# Patient Record
Sex: MALE
[De-identification: names, ages, dates, MRNs, and addresses within clinical notes are randomized per-mention and may not be internally consistent; named-entity substitution may affect disease eponyms.]

---

## 2021-08-02 PROBLEM — Z00.00 ENCOUNTER FOR PREVENTIVE HEALTH EXAMINATION: Status: ACTIVE | Noted: 2021-08-02

## 2021-08-11 ENCOUNTER — APPOINTMENT (OUTPATIENT)
Dept: PULMONOLOGY | Facility: CLINIC | Age: 46
End: 2021-08-11
Payer: COMMERCIAL

## 2021-08-11 VITALS
WEIGHT: 172 LBS | OXYGEN SATURATION: 97 % | BODY MASS INDEX: 28.66 KG/M2 | DIASTOLIC BLOOD PRESSURE: 80 MMHG | SYSTOLIC BLOOD PRESSURE: 128 MMHG | HEART RATE: 113 BPM | RESPIRATION RATE: 17 BRPM | HEIGHT: 65 IN | TEMPERATURE: 97 F

## 2021-08-11 PROCEDURE — 99203 OFFICE O/P NEW LOW 30 MIN: CPT

## 2021-08-11 NOTE — PHYSICAL EXAM
[No Acute Distress] : no acute distress [Well Nourished] : well nourished [Well Developed] : well developed [Normal Oropharynx] : normal oropharynx [I] : Mallampati Class: I [Supple] : supple [Normal Appearance] : normal appearance [No Neck Mass] : no neck mass [No JVD] : no jvd [Normal Rate/Rhythm] : normal rate/rhythm [Normal S1, S2] : normal s1, s2 [No Murmurs] : no murmurs [No Resp Distress] : no resp distress [Clear to Auscultation Bilaterally] : clear to auscultation bilaterally [Benign] : benign [Not Tender] : not tender [No Masses] : no masses [No Clubbing] : no clubbing [No Edema] : no edema [No Focal Deficits] : no focal deficits [Oriented x3] : oriented x3 [Normal Affect] : normal affect

## 2021-08-12 NOTE — DISCUSSION/SUMMARY
[FreeTextEntry1] : incidental small nodule in RML\par \par PLAN\par \par NC CT chest\par \par will bring in records of treatment of latent TB\par \par Raffy Chadwick MD

## 2021-08-12 NOTE — HISTORY OF PRESENT ILLNESS
[TextBox_4] : 46 year old patient presents for evaluation of 3 mm RML nodule in subpleural location noted on CT abdomen obtained by his urologist in evaluation of dysuria\par \par \par He has no active pulmonary problems.  He denies history of COPD, asthma, pneumonia, chest pain, postnasal drip,  sinusitis, wheeze, cough, sputum production or shortness of breath. 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, computer work\par \par No exposure to chemicals, dust, asbestos, mold\par \par \par ALLERGY:\par \par NKDA\par \par \par environmental/seasonal allergy:   none\par \par \par Review of Systems:\par \par \par no sinusitis, sinus infections, nasal obstruction\par no dysphagia\par no dry mouth\par \par no arthritis\par no joint aches\par no joint swelling\par \par \par no pneumonia\par no wheeze\par no lung cancer\par \par no CAD\par no MI\par no chest pain\par no murmur\par no CHF\par no HTN\par no edema\par \par no peptic ulcer or gastritis\par \par \par no Diabetes\par no thyroid disease\par no hyperlipidemia\par \par \par no bleeding\par \par no DVT or PE\par \par no kidney disease\par \par no stroke\par no seizure\par \par \par \par \par \par \par \par \par \par \par \par   [Hypersomnolence] : denies hypersomnolence [Snoring] : snoring [Unintentional Sleep while Inactive] : no unintentional sleep while inactive

## 2021-10-20 ENCOUNTER — APPOINTMENT (OUTPATIENT)
Dept: PULMONOLOGY | Facility: CLINIC | Age: 46
End: 2021-10-20
Payer: COMMERCIAL

## 2021-10-20 VITALS
BODY MASS INDEX: 28.46 KG/M2 | HEART RATE: 87 BPM | TEMPERATURE: 98 F | WEIGHT: 171 LBS | OXYGEN SATURATION: 97 % | SYSTOLIC BLOOD PRESSURE: 120 MMHG | DIASTOLIC BLOOD PRESSURE: 84 MMHG

## 2021-10-20 PROCEDURE — 99213 OFFICE O/P EST LOW 20 MIN: CPT

## 2021-10-20 NOTE — DISCUSSION/SUMMARY
[FreeTextEntry1] : \par NC CT chest demonstrates several small nodules up to 4 mm.  He is a former smoker\par \par History of treated LTBI\par \par PLAN\par \par He should undergo NC CT chest repeat 8-12 months\par \par He has had the influenza vaccine this season.\par \par He has had COVID vaccine\par \par \par Raffy Chadwick MD

## 2021-10-20 NOTE — HISTORY OF PRESENT ILLNESS
[Snoring] : snoring [TextBox_4] : 46 year old patient presented for evaluation of 3 mm RML nodule in subpleural location noted on CT abdomen obtained by his urologist in evaluation of dysuria\par \par \par He has no active pulmonary problems.  He denies history of COPD, asthma, pneumonia, chest pain, postnasal drip,  sinusitis, wheeze, cough, sputum production or shortness of breath. 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\par \par he states he took INH in 2009\par \par MEDS\par \par tamsulosin\par \par SH:\par \par \par former smoker\par \par \par 3 cigarettes  per day\par \par smoked for 12  years\par \par stopped smoking  2 years ago\par \par ETOH: none\par \par \par 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, computer work\par \par No exposure to chemicals, dust, asbestos, mold\par \par \par ALLERGY:\par \par NKDA\par \par \par environmental/seasonal allergy:   none\par \par \par Review of Systems:\par \par \par no sinusitis, sinus infections, nasal obstruction\par no dysphagia\par no dry mouth\par \par no arthritis\par no joint aches\par no joint swelling\par \par \par no pneumonia\par no wheeze\par no lung cancer\par \par no CAD\par no MI\par no chest pain\par no murmur\par no CHF\par no HTN\par no edema\par \par no peptic ulcer or gastritis\par \par \par no Diabetes\par no thyroid disease\par no hyperlipidemia\par \par \par no bleeding\par \par no DVT or PE\par \par no kidney disease\par \par no stroke\par no seizure\par \par \par \par \par \par \par \par \par \par \par \par   [Hypersomnolence] : denies hypersomnolence [Unintentional Sleep while Inactive] : no unintentional sleep while inactive

## 2021-10-20 NOTE — PHYSICAL EXAM
[No Acute Distress] : no acute distress [Well Nourished] : well nourished [Well Developed] : well developed [Normal Oropharynx] : normal oropharynx [I] : Mallampati Class: I [Normal Appearance] : normal appearance [Supple] : supple [No Neck Mass] : no neck mass [No JVD] : no jvd [Normal Rate/Rhythm] : normal rate/rhythm [Normal S1, S2] : normal s1, s2 [No Murmurs] : no murmurs [No Resp Distress] : no resp distress [Clear to Auscultation Bilaterally] : clear to auscultation bilaterally [Benign] : benign [Not Tender] : not tender [No Masses] : no masses [No Clubbing] : no clubbing [No Edema] : no edema [No Focal Deficits] : no focal deficits [Oriented x3] : oriented x3 [Normal Affect] : normal affect

## 2022-04-15 ENCOUNTER — NON-APPOINTMENT (OUTPATIENT)
Age: 47
End: 2022-04-15

## 2022-05-11 ENCOUNTER — APPOINTMENT (OUTPATIENT)
Dept: PULMONOLOGY | Facility: CLINIC | Age: 47
End: 2022-05-11
Payer: COMMERCIAL

## 2022-05-11 VITALS
WEIGHT: 169 LBS | TEMPERATURE: 97.8 F | DIASTOLIC BLOOD PRESSURE: 82 MMHG | OXYGEN SATURATION: 97 % | SYSTOLIC BLOOD PRESSURE: 118 MMHG | HEART RATE: 90 BPM | BODY MASS INDEX: 28.12 KG/M2

## 2022-05-11 PROCEDURE — 99214 OFFICE O/P EST MOD 30 MIN: CPT

## 2022-05-13 NOTE — HISTORY OF PRESENT ILLNESS
[Snoring] : snoring [TextBox_4] : 46 year old patient presented for evaluation of 3 mm RML nodule in subpleural location noted on CT abdomen obtained by his urologist in evaluation of dysuria\par \par \par He has no active pulmonary problems.  He denies history of COPD, asthma, pneumonia, chest pain, postnasal drip,  sinusitis, wheeze, cough, sputum production or shortness of breath. 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\par \par he states he took INH in 2009\par \par MEDS\par \par tamsulosin\par \par SH:\par \par \par former smoker\par \par \par 3 cigarettes  per day\par \par smoked for 12  years\par \par stopped smoking  2 years ago\par \par ETOH: none\par \par \par 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, computer work\par \par No exposure to chemicals, dust, asbestos, mold\par \par \par ALLERGY:\par \par NKDA\par \par \par environmental/seasonal allergy:   none\par \par \par Review of Systems:\par \par \par no sinusitis, sinus infections, nasal obstruction\par no dysphagia\par no dry mouth\par \par no arthritis\par no joint aches\par no joint swelling\par \par \par no pneumonia\par no wheeze\par no lung cancer\par \par no CAD\par no MI\par no chest pain\par no murmur\par no CHF\par no HTN\par no edema\par \par no peptic ulcer or gastritis\par \par \par no Diabetes\par no thyroid disease\par no hyperlipidemia\par \par \par no bleeding\par \par no DVT or PE\par \par no kidney disease\par \par no stroke\par no seizure\par \par \par \par \par \par \par \par \par \par \par \par   [Hypersomnolence] : denies hypersomnolence [Unintentional Sleep while Inactive] : no unintentional sleep while inactive

## 2022-05-13 NOTE — DISCUSSION/SUMMARY
[FreeTextEntry1] : NC CT chest demonstrates several small nodules up to 4 mm. He is a former smoker\par \par History of treated LTBI\par \par Repeat CT chest March 2022 demonstrated stability of these nodules without interval growth\par \par PLAN\par \par He should undergo NC CT chest repeat 12 months due to his  history of smoking\par \par This has been discussed with the the patient who understands\par \par He has had the influenza vaccine this season.\par \par He has had COVID vaccine\par \par Total time spent : 30 minutes\par Including:\par Preparation prior to visit - Reviewing prior record, results of tests and Consultation Reports as applicable\par Conducting an appropriate H & P during today's encounter\par Appropriate orders for tests, medications and procedures, as applicable\par Counseling patient \par Note completion \par \par Raffy Chadwick MD. \par

## 2023-03-15 ENCOUNTER — APPOINTMENT (OUTPATIENT)
Dept: PULMONOLOGY | Facility: CLINIC | Age: 48
End: 2023-03-15
Payer: COMMERCIAL

## 2023-03-15 VITALS
TEMPERATURE: 96.6 F | BODY MASS INDEX: 29.95 KG/M2 | WEIGHT: 180 LBS | SYSTOLIC BLOOD PRESSURE: 130 MMHG | HEART RATE: 98 BPM | DIASTOLIC BLOOD PRESSURE: 84 MMHG | OXYGEN SATURATION: 98 %

## 2023-03-15 PROCEDURE — 99214 OFFICE O/P EST MOD 30 MIN: CPT

## 2023-03-18 NOTE — PHYSICAL EXAM
[No Acute Distress] : no acute distress [Well Nourished] : well nourished [Well Developed] : well developed [Normal Oropharynx] : normal oropharynx [I] : Mallampati Class: I [Normal Appearance] : normal appearance [Supple] : supple [No Neck Mass] : no neck mass [No JVD] : no jvd [Normal Rate/Rhythm] : normal rate/rhythm [Normal S1, S2] : normal s1, s2 [No Murmurs] : no murmurs [No Resp Distress] : no resp distress [Clear to Auscultation Bilaterally] : clear to auscultation bilaterally [Benign] : benign [Not Tender] : not tender [No Masses] : no masses [No Clubbing] : no clubbing [No Edema] : no edema [No Focal Deficits] : no focal deficits [Oriented x3] : oriented x3 [Normal Affect] : normal affect [TextBox_68] : stable

## 2023-03-18 NOTE — DISCUSSION/SUMMARY
[FreeTextEntry1] : NC CT chest demonstrates several small nodules up to 6 mm. He is a former smoker\par \par History of treated LTBI\par \par Repeat CT chest March 2022 demonstrated stability of these nodules without interval growth\par \par PLAN\par \par He should undergo NC CT chest at this time as he has some smoking  history\par This has been discussed with the the patient who understands\par \par I will also perform  blood testing for nodule risk assessment (Nodify CDT)\par \par He has had the influenza vaccine this season.\par \par He has had COVID vaccine\par \par Total time spent : 30 minutes\par Including:\par Preparation prior to visit - Reviewing prior record, results of tests and Consultation Reports as applicable\par Conducting an appropriate H & P during today's encounter\par Appropriate orders for tests, medications and procedures, as applicable\par Counseling patient \par Note completion \par \par Raffy Chadwick MD. \par

## 2023-03-18 NOTE — HISTORY OF PRESENT ILLNESS
[Former] : former [Never] : never [Snoring] : snoring [TextBox_4] : 47 year old patient presented for evaluation of 3 mm RML nodule in subpleural location noted on CT abdomen obtained by his urologist in evaluation of dysuria\par \par \par He has no active pulmonary problems.  He denies history of COPD, asthma, pneumonia, chest pain, postnasal drip,  sinusitis, wheeze, cough, sputum production or shortness of breath. \par \par \par Primary doctor is Dr Koko Sky\par \par He underwent CT chest on 08/28/2021 that demonstrated several nodules p to 4 mm, no cavitary areas or infiltrate\par \par He also reports that he has been previously treated for LTBI in 2009 for 9 months\par \par He is a former smoker\par \par Repeat CT chest 03/26/2022 demonstrated several nodules, stable compared to prior\par \par he denies dyspnea\par \par He has undergone Urolift after episode of urinary retention\par \par he is being followed for CT chest that demonstrated several nodules up to 3 mm and one larger, 5-6 mm lung nodule in the right costophrenic angle\par \par PSH:\par \par stye/eye\par \par \par PMH:\par \par BPH\par \par Gout\par \par positive PPD, 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\par \par he states he took INH in 2009\par \par MEDS\par \par tamsulosin\par \par SH:\par \par \par former smoker\par \par \par 3 cigarettes  per day\par \par smoked for 12  years\par \par stopped smoking  2 years ago\par \par ETOH: none\par \par \par 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, computer work\par \par No exposure to chemicals, dust, asbestos, mold\par \par \par ALLERGY:\par \par NKDA\par \par \par environmental/seasonal allergy:   none\par \par \par Review of Systems:\par \par \par no sinusitis, sinus infections, nasal obstruction\par no dysphagia\par no dry mouth\par \par no arthritis\par no joint aches\par no joint swelling\par \par \par no pneumonia\par no wheeze\par no lung cancer\par \par no CAD\par no MI\par no chest pain\par no murmur\par no CHF\par no HTN\par no edema\par \par no peptic ulcer or gastritis\par \par \par no Diabetes\par no thyroid disease\par no hyperlipidemia\par \par \par no bleeding\par \par no DVT or PE\par \par no kidney disease\par \par no stroke\par no seizure\par \par \par \par \par \par \par \par \par \par \par \par   [TextBox_11] : .2 [TextBox_13] : 12 [YearQuit] : 2019 [Hypersomnolence] : denies hypersomnolence [Unintentional Sleep while Inactive] : no unintentional sleep while inactive

## 2023-07-26 ENCOUNTER — APPOINTMENT (OUTPATIENT)
Dept: PULMONOLOGY | Facility: CLINIC | Age: 48
End: 2023-07-26
Payer: COMMERCIAL

## 2023-07-26 VITALS
BODY MASS INDEX: 28.96 KG/M2 | WEIGHT: 174 LBS | SYSTOLIC BLOOD PRESSURE: 108 MMHG | TEMPERATURE: 97.5 F | OXYGEN SATURATION: 98 % | DIASTOLIC BLOOD PRESSURE: 74 MMHG | HEART RATE: 84 BPM

## 2023-07-26 PROCEDURE — 99213 OFFICE O/P EST LOW 20 MIN: CPT

## 2023-07-26 NOTE — DISCUSSION/SUMMARY
[FreeTextEntry1] : NC CT chest demonstrates several small nodules up to 6 mm. He is a former smoker\par \par History of treated LTBI\par 04/23/2023\par \par There are numerous small nodules  6 mm or less, most likely benign post inflammatory\par \par Nodify was very low likelihood\par PLAN\par \par Will obtain repeat CT chest in 01/2024 to complete 2-year surveillance for lung nodule, given  history of smoking\par \par He has had the influenza vaccine this season.\par \par He has had COVID vaccine\par \par Total time spent : 30 minutes\par Including:\par Preparation prior to visit - Reviewing prior record, results of tests and Consultation Reports as applicable\par Conducting an appropriate H & P during today's encounter\par Appropriate orders for tests, medications and procedures, as applicable\par Counseling patient \par Note completion \par \par Raffy Chadwick MD. \par

## 2023-07-26 NOTE — PHYSICAL EXAM
[No Acute Distress] : no acute distress [Well Nourished] : well nourished [Well Developed] : well developed [Normal Oropharynx] : normal oropharynx [I] : Mallampati Class: I [Normal Appearance] : normal appearance [Supple] : supple [No Neck Mass] : no neck mass [No JVD] : no jvd [Normal Rate/Rhythm] : normal rate/rhythm [Normal S1, S2] : normal s1, s2 [No Murmurs] : no murmurs [No Resp Distress] : no resp distress [Clear to Auscultation Bilaterally] : clear to auscultation bilaterally [Benign] : benign [Not Tender] : not tender [No Masses] : no masses [No Clubbing] : no clubbing [No Edema] : no edema [No Focal Deficits] : no focal deficits [Normal Affect] : normal affect [Oriented x3] : oriented x3 [TextBox_68] : stable

## 2023-07-26 NOTE — HISTORY OF PRESENT ILLNESS
[Former] : former [Never] : never [Snoring] : snoring [TextBox_4] : 47 year old patient presented for evaluation of 3 mm RML nodule in subpleural location noted on CT abdomen obtained by his urologist in evaluation of dysuria\par \par \par He has no active pulmonary problems.  He denies history of COPD, asthma, pneumonia, chest pain, postnasal drip,  sinusitis, wheeze, cough, sputum production or shortness of breath. \par \par \par Primary doctor is Dr Koko Sky\par \par He underwent CT chest on 08/28/2021 that demonstrated several nodules p to 4 mm, no cavitary areas or infiltrate\par \par He also reports that he has been previously treated for LTBI in 2009 for 9 months\par \par He is a former smoker\par \par Repeat CT chest 03/26/2022 demonstrated several nodules, stable compared to prior\par \par he denies dyspnea\par \par He has undergone Urolift after episode of urinary retention\par \par he is being followed for CT chest that demonstrated several nodules up to 3 mm and one larger, 5-6 mm lung nodule in the right costophrenic angle\par \par PSH:\par \par stye/eye\par \par \par PMH:\par \par BPH\par \par Gout\par \par positive PPD, 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\par \par he states he took INH in 2009\par \par MEDS\par \par tamsulosin\par \par SH:\par \par \par former smoker\par \par \par 3 cigarettes  per day\par \par smoked for 12  years\par \par stopped smoking  2 years ago\par \par ETOH: none\par \par \par 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, computer work\par \par No exposure to chemicals, dust, asbestos, mold\par \par \par ALLERGY:\par \par NKDA\par \par \par environmental/seasonal allergy:   none\par \par \par Review of Systems:\par \par \par no sinusitis, sinus infections, nasal obstruction\par no dysphagia\par no dry mouth\par \par no arthritis\par no joint aches\par no joint swelling\par \par \par no pneumonia\par no wheeze\par no lung cancer\par \par no CAD\par no MI\par no chest pain\par no murmur\par no CHF\par no HTN\par no edema\par \par no peptic ulcer or gastritis\par \par \par no Diabetes\par no thyroid disease\par no hyperlipidemia\par \par \par no bleeding\par \par no DVT or PE\par \par no kidney disease\par \par no stroke\par no seizure\par \par \par \par \par \par \par \par \par \par \par \par   [TextBox_11] : .2 [TextBox_13] : 12 [YearQuit] : 2019 [Unintentional Sleep while Inactive] : no unintentional sleep while inactive [Hypersomnolence] : denies hypersomnolence

## 2024-01-03 ENCOUNTER — APPOINTMENT (OUTPATIENT)
Dept: CT IMAGING | Facility: CLINIC | Age: 49
End: 2024-01-03
Payer: COMMERCIAL

## 2024-01-03 PROCEDURE — 71250 CT THORAX DX C-: CPT

## 2024-03-13 ENCOUNTER — APPOINTMENT (OUTPATIENT)
Dept: PULMONOLOGY | Facility: CLINIC | Age: 49
End: 2024-03-13

## 2024-03-13 NOTE — PHYSICAL EXAM
[Well Nourished] : well nourished [No Acute Distress] : no acute distress [Well Developed] : well developed [Normal Oropharynx] : normal oropharynx [I] : Mallampati Class: I [Normal Appearance] : normal appearance [Supple] : supple [No Neck Mass] : no neck mass [No JVD] : no jvd [Normal S1, S2] : normal s1, s2 [Normal Rate/Rhythm] : normal rate/rhythm [No Murmurs] : no murmurs [No Resp Distress] : no resp distress [Benign] : benign [Clear to Auscultation Bilaterally] : clear to auscultation bilaterally [Not Tender] : not tender [No Masses] : no masses [No Edema] : no edema [No Clubbing] : no clubbing [No Focal Deficits] : no focal deficits [Oriented x3] : oriented x3 [Normal Affect] : normal affect [TextBox_68] : stable

## 2024-04-01 ENCOUNTER — APPOINTMENT (OUTPATIENT)
Dept: PULMONOLOGY | Facility: CLINIC | Age: 49
End: 2024-04-01

## 2024-04-01 NOTE — HISTORY OF PRESENT ILLNESS
[Former] : former [Never] : never [Snoring] : snoring [TextBox_4] : 47 year old patient presented for evaluation of 3 mm RML nodule in subpleural location noted on CT abdomen obtained by his urologist in evaluation of dysuria\par  \par  \par  He has no active pulmonary problems.  He denies history of COPD, asthma, pneumonia, chest pain, postnasal drip,  sinusitis, wheeze, cough, sputum production or shortness of breath. \par  \par  \par  Primary doctor is Dr Koko Sky\par  \par  He underwent CT chest on 08/28/2021 that demonstrated several nodules p to 4 mm, no cavitary areas or infiltrate\par  \par  He also reports that he has been previously treated for LTBI in 2009 for 9 months\par  \par  He is a former smoker\par  \par  Repeat CT chest 03/26/2022 demonstrated several nodules, stable compared to prior\par  \par  he denies dyspnea\par  \par  He has undergone Urolift after episode of urinary retention\par  \par  he is being followed for CT chest that demonstrated several nodules up to 3 mm and one larger, 5-6 mm lung nodule in the right costophrenic angle\par  \par  PSH:\par  \par  stye/eye\par  \par  \par  PMH:\par  \par  BPH\par  \par  Gout\par  \par  positive PPD, 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\par  \par  he states he took INH in 2009\par  \par  MEDS\par  \par  tamsulosin\par  \par  SH:\par  \par  \par  former smoker\par  \par  \par  3 cigarettes  per day\par  \par  smoked for 12  years\par  \par  stopped smoking  2 years ago\par  \par  ETOH: none\par  \par  \par  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[employer], computer work\par  \par  No exposure to chemicals, dust, asbestos, mold\par  \par  \par  ALLERGY:\par  \par  NKDA\par  \par  \par  environmental/seasonal allergy:   none\par  \par  \par  Review of Systems:\par  \par  \par  no sinusitis, sinus infections, nasal obstruction\par  no dysphagia\par  no dry mouth\par  \par  no arthritis\par  no joint aches\par  no joint swelling\par  \par  \par  no pneumonia\par  no wheeze\par  no lung cancer\par  \par  no CAD\par  no MI\par  no chest pain\par  no murmur\par  no CHF\par  no HTN\par  no edema\par  \par  no peptic ulcer or gastritis\par  \par  \par  no Diabetes\par  no thyroid disease\par  no hyperlipidemia\par  \par  \par  no bleeding\par  \par  no DVT or PE\par  \par  no kidney disease\par  \par  no stroke\par  no seizure\par  \par  \par  \par  \par  \par  \par  \par  \par  \par  \par  \par    [TextBox_13] : 12 [TextBox_11] : .2 [Unintentional Sleep while Inactive] : no unintentional sleep while inactive [YearQuit] : 2019 [Hypersomnolence] : denies hypersomnolence

## 2024-04-01 NOTE — PHYSICAL EXAM
[Well Nourished] : well nourished [No Acute Distress] : no acute distress [Normal Oropharynx] : normal oropharynx [Well Developed] : well developed [I] : Mallampati Class: I [Normal Appearance] : normal appearance [No Neck Mass] : no neck mass [Supple] : supple [No JVD] : no jvd [Normal Rate/Rhythm] : normal rate/rhythm [Normal S1, S2] : normal s1, s2 [No Murmurs] : no murmurs [No Resp Distress] : no resp distress [Clear to Auscultation Bilaterally] : clear to auscultation bilaterally [Not Tender] : not tender [Benign] : benign [No Clubbing] : no clubbing [No Masses] : no masses [Oriented x3] : oriented x3 [No Edema] : no edema [No Focal Deficits] : no focal deficits [Normal Affect] : normal affect [TextBox_68] : stable

## 2024-04-15 ENCOUNTER — APPOINTMENT (OUTPATIENT)
Dept: PULMONOLOGY | Facility: CLINIC | Age: 49
End: 2024-04-15
Payer: COMMERCIAL

## 2024-04-15 VITALS
HEART RATE: 85 BPM | SYSTOLIC BLOOD PRESSURE: 118 MMHG | TEMPERATURE: 97.8 F | OXYGEN SATURATION: 98 % | BODY MASS INDEX: 28.62 KG/M2 | WEIGHT: 172 LBS | DIASTOLIC BLOOD PRESSURE: 80 MMHG

## 2024-04-15 DIAGNOSIS — R91.1 SOLITARY PULMONARY NODULE: ICD-10-CM

## 2024-04-15 PROCEDURE — 99213 OFFICE O/P EST LOW 20 MIN: CPT

## 2024-04-15 NOTE — PHYSICAL EXAM
[No Acute Distress] : no acute distress [Well Nourished] : well nourished [Well Developed] : well developed [Normal Oropharynx] : normal oropharynx [I] : Mallampati Class: I [Normal Appearance] : normal appearance [Supple] : supple [No Neck Mass] : no neck mass [No JVD] : no jvd [Normal Rate/Rhythm] : normal rate/rhythm [Normal S1, S2] : normal s1, s2 [No Murmurs] : no murmurs [No Resp Distress] : no resp distress [Clear to Auscultation Bilaterally] : clear to auscultation bilaterally [Benign] : benign [Not Tender] : not tender [No Masses] : no masses [No Clubbing] : no clubbing [No Edema] : no edema [Oriented x3] : oriented x3 [No Focal Deficits] : no focal deficits [Normal Affect] : normal affect [TextBox_68] : stable

## 2024-04-15 NOTE — DISCUSSION/SUMMARY
[FreeTextEntry1] : [place] CT chest demonstrates several small nodules up to 6 mm. He is a former smoker  History of treated LTBI  CT chest 04/23/2023  There are numerous small nodules  6 mm or less, most likely benign post inflammatory  Nodify was very low likelihood  PLAN  Will obtain repeat CT chest in 01/2025  to complete 2-year surveillance for lung nodule, given  history of smoking  He has had the influenza vaccine this season.  He has had COVID vaccine  Total time spent : 30 minutes Including: Preparation prior to visit - Reviewing prior record, results of tests and Consultation Reports as applicable Conducting an appropriate H & P during today's encounter Appropriate orders for tests, medications and procedures, as applicable Counseling patient  Note completion   Raffy Chadwick MD.

## 2024-11-06 ENCOUNTER — APPOINTMENT (OUTPATIENT)
Dept: PULMONOLOGY | Facility: CLINIC | Age: 49
End: 2024-11-06
Payer: COMMERCIAL

## 2024-11-06 ENCOUNTER — NON-APPOINTMENT (OUTPATIENT)
Age: 49
End: 2024-11-06

## 2024-11-06 VITALS
HEART RATE: 83 BPM | TEMPERATURE: 98.1 F | BODY MASS INDEX: 29.29 KG/M2 | WEIGHT: 176 LBS | SYSTOLIC BLOOD PRESSURE: 122 MMHG | OXYGEN SATURATION: 98 % | DIASTOLIC BLOOD PRESSURE: 80 MMHG

## 2024-11-06 DIAGNOSIS — R91.1 SOLITARY PULMONARY NODULE: ICD-10-CM

## 2024-11-06 PROCEDURE — 99214 OFFICE O/P EST MOD 30 MIN: CPT

## 2025-02-05 ENCOUNTER — APPOINTMENT (OUTPATIENT)
Dept: PULMONOLOGY | Facility: CLINIC | Age: 50
End: 2025-02-05

## 2025-02-12 ENCOUNTER — NON-APPOINTMENT (OUTPATIENT)
Age: 50
End: 2025-02-12

## 2025-02-12 ENCOUNTER — APPOINTMENT (OUTPATIENT)
Dept: PULMONOLOGY | Facility: CLINIC | Age: 50
End: 2025-02-12
Payer: COMMERCIAL

## 2025-02-12 VITALS
HEART RATE: 79 BPM | WEIGHT: 176 LBS | BODY MASS INDEX: 29.32 KG/M2 | DIASTOLIC BLOOD PRESSURE: 80 MMHG | HEIGHT: 65 IN | OXYGEN SATURATION: 98 % | SYSTOLIC BLOOD PRESSURE: 138 MMHG | TEMPERATURE: 97.3 F

## 2025-02-12 DIAGNOSIS — R91.1 SOLITARY PULMONARY NODULE: ICD-10-CM

## 2025-02-12 PROCEDURE — 99213 OFFICE O/P EST LOW 20 MIN: CPT
